# Patient Record
Sex: FEMALE | HISPANIC OR LATINO | URBAN - METROPOLITAN AREA
[De-identification: names, ages, dates, MRNs, and addresses within clinical notes are randomized per-mention and may not be internally consistent; named-entity substitution may affect disease eponyms.]

---
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
## 2019-03-07 ENCOUNTER — EMERGENCY (EMERGENCY)
Facility: HOSPITAL | Age: 19
LOS: 0 days | Discharge: HOME | End: 2019-03-07
Attending: STUDENT IN AN ORGANIZED HEALTH CARE EDUCATION/TRAINING PROGRAM | Admitting: STUDENT IN AN ORGANIZED HEALTH CARE EDUCATION/TRAINING PROGRAM
Payer: MEDICAID

## 2019-03-07 VITALS
TEMPERATURE: 98 F | RESPIRATION RATE: 18 BRPM | HEART RATE: 63 BPM | DIASTOLIC BLOOD PRESSURE: 62 MMHG | OXYGEN SATURATION: 100 % | SYSTOLIC BLOOD PRESSURE: 100 MMHG

## 2019-03-07 VITALS — HEIGHT: 64 IN | WEIGHT: 139.99 LBS

## 2019-03-07 DIAGNOSIS — O03.9 COMPLETE OR UNSPECIFIED SPONTANEOUS ABORTION WITHOUT COMPLICATION: ICD-10-CM

## 2019-03-07 DIAGNOSIS — N93.9 ABNORMAL UTERINE AND VAGINAL BLEEDING, UNSPECIFIED: ICD-10-CM

## 2019-03-07 DIAGNOSIS — R10.31 RIGHT LOWER QUADRANT PAIN: ICD-10-CM

## 2019-03-07 LAB
ALBUMIN SERPL ELPH-MCNC: 4.1 G/DL — SIGNIFICANT CHANGE UP (ref 3.3–5)
ALP SERPL-CCNC: 58 U/L — SIGNIFICANT CHANGE UP (ref 40–120)
ALT FLD-CCNC: 15 U/L — SIGNIFICANT CHANGE UP (ref 12–78)
ANION GAP SERPL CALC-SCNC: 7 MMOL/L — SIGNIFICANT CHANGE UP (ref 5–17)
APPEARANCE UR: CLEAR — SIGNIFICANT CHANGE UP
APTT BLD: 33.5 SEC — SIGNIFICANT CHANGE UP (ref 27.5–36.3)
AST SERPL-CCNC: 14 U/L — LOW (ref 15–37)
BACTERIA # UR AUTO: ABNORMAL
BASOPHILS # BLD AUTO: 0.03 K/UL — SIGNIFICANT CHANGE UP (ref 0–0.2)
BASOPHILS NFR BLD AUTO: 0.5 % — SIGNIFICANT CHANGE UP (ref 0–2)
BILIRUB SERPL-MCNC: 0.5 MG/DL — SIGNIFICANT CHANGE UP (ref 0.2–1.2)
BILIRUB UR-MCNC: NEGATIVE — SIGNIFICANT CHANGE UP
BLD GP AB SCN SERPL QL: SIGNIFICANT CHANGE UP
BUN SERPL-MCNC: 13 MG/DL — SIGNIFICANT CHANGE UP (ref 7–23)
CALCIUM SERPL-MCNC: 8.7 MG/DL — SIGNIFICANT CHANGE UP (ref 8.5–10.1)
CHLORIDE SERPL-SCNC: 108 MMOL/L — SIGNIFICANT CHANGE UP (ref 96–108)
CO2 SERPL-SCNC: 27 MMOL/L — SIGNIFICANT CHANGE UP (ref 22–31)
COLOR SPEC: YELLOW — SIGNIFICANT CHANGE UP
CREAT SERPL-MCNC: 0.73 MG/DL — SIGNIFICANT CHANGE UP (ref 0.5–1.3)
DIFF PNL FLD: ABNORMAL
EOSINOPHIL # BLD AUTO: 0.06 K/UL — SIGNIFICANT CHANGE UP (ref 0–0.5)
EOSINOPHIL NFR BLD AUTO: 1 % — SIGNIFICANT CHANGE UP (ref 0–6)
EPI CELLS # UR: ABNORMAL
GLUCOSE SERPL-MCNC: 87 MG/DL — SIGNIFICANT CHANGE UP (ref 70–99)
GLUCOSE UR QL: NEGATIVE MG/DL — SIGNIFICANT CHANGE UP
HCG SERPL-ACNC: 24 MIU/ML — HIGH
HCT VFR BLD CALC: 35.7 % — SIGNIFICANT CHANGE UP (ref 34.5–45)
HGB BLD-MCNC: 11.6 G/DL — SIGNIFICANT CHANGE UP (ref 11.5–15.5)
IMM GRANULOCYTES NFR BLD AUTO: 0.3 % — SIGNIFICANT CHANGE UP (ref 0–1.5)
INR BLD: 1.16 RATIO — SIGNIFICANT CHANGE UP (ref 0.88–1.16)
KETONES UR-MCNC: NEGATIVE — SIGNIFICANT CHANGE UP
LEUKOCYTE ESTERASE UR-ACNC: NEGATIVE — SIGNIFICANT CHANGE UP
LYMPHOCYTES # BLD AUTO: 1.72 K/UL — SIGNIFICANT CHANGE UP (ref 1–3.3)
LYMPHOCYTES # BLD AUTO: 29.3 % — SIGNIFICANT CHANGE UP (ref 13–44)
MCHC RBC-ENTMCNC: 26.4 PG — LOW (ref 27–34)
MCHC RBC-ENTMCNC: 32.5 GM/DL — SIGNIFICANT CHANGE UP (ref 32–36)
MCV RBC AUTO: 81.3 FL — SIGNIFICANT CHANGE UP (ref 80–100)
MONOCYTES # BLD AUTO: 0.34 K/UL — SIGNIFICANT CHANGE UP (ref 0–0.9)
MONOCYTES NFR BLD AUTO: 5.8 % — SIGNIFICANT CHANGE UP (ref 2–14)
NEUTROPHILS # BLD AUTO: 3.71 K/UL — SIGNIFICANT CHANGE UP (ref 1.8–7.4)
NEUTROPHILS NFR BLD AUTO: 63.1 % — SIGNIFICANT CHANGE UP (ref 43–77)
NITRITE UR-MCNC: NEGATIVE — SIGNIFICANT CHANGE UP
NRBC # BLD: 0 /100 WBCS — SIGNIFICANT CHANGE UP (ref 0–0)
PH UR: 5 — SIGNIFICANT CHANGE UP (ref 5–8)
PLATELET # BLD AUTO: 297 K/UL — SIGNIFICANT CHANGE UP (ref 150–400)
POTASSIUM SERPL-MCNC: 4.4 MMOL/L — SIGNIFICANT CHANGE UP (ref 3.5–5.3)
POTASSIUM SERPL-SCNC: 4.4 MMOL/L — SIGNIFICANT CHANGE UP (ref 3.5–5.3)
PROT SERPL-MCNC: 8.4 GM/DL — HIGH (ref 6–8.3)
PROT UR-MCNC: NEGATIVE MG/DL — SIGNIFICANT CHANGE UP
PROTHROM AB SERPL-ACNC: 12.9 SEC — SIGNIFICANT CHANGE UP (ref 10–12.9)
RBC # BLD: 4.39 M/UL — SIGNIFICANT CHANGE UP (ref 3.8–5.2)
RBC # FLD: 14.9 % — HIGH (ref 10.3–14.5)
RBC CASTS # UR COMP ASSIST: ABNORMAL /HPF (ref 0–4)
SODIUM SERPL-SCNC: 142 MMOL/L — SIGNIFICANT CHANGE UP (ref 135–145)
SP GR SPEC: 1.02 — SIGNIFICANT CHANGE UP (ref 1.01–1.02)
TYPE + AB SCN PNL BLD: SIGNIFICANT CHANGE UP
UROBILINOGEN FLD QL: NEGATIVE MG/DL — SIGNIFICANT CHANGE UP
WBC # BLD: 5.88 K/UL — SIGNIFICANT CHANGE UP (ref 3.8–10.5)
WBC # FLD AUTO: 5.88 K/UL — SIGNIFICANT CHANGE UP (ref 3.8–10.5)
WBC UR QL: SIGNIFICANT CHANGE UP

## 2019-03-07 PROCEDURE — 99284 EMERGENCY DEPT VISIT MOD MDM: CPT

## 2019-03-07 PROCEDURE — 76830 TRANSVAGINAL US NON-OB: CPT | Mod: 26

## 2019-03-07 NOTE — ED STATDOCS - ATTENDING CONTRIBUTION TO CARE
I, Clarice Ferris DO,  performed the initial face to face bedside interview with this patient regarding history of present illness, review of symptoms and relevant past medical, social and family history.  I completed an independent physical examination.  I was the initial provider who evaluated this patient. I have signed out the follow up of any pending tests (i.e. labs, radiological studies) to the ACP.  I have communicated the patient’s plan of care and disposition with the ACP.  The history, relevant review of systems, past medical and surgical history, medical decision making, and physical examination was documented by the scribe in my presence and I attest to the accuracy of the documentation.

## 2019-03-07 NOTE — ED STATDOCS - PROGRESS NOTE DETAILS
18 yr. old female PMH:  presents to ED with heavy  vaginal bleeding 2 1/2 weeks ago that lessened 3 days ago seen at [facility] where US and labs were done . Sent to ED for further evaluation of possible ectopic pregnancy. Seen and examined by attending in Intake. Plan: IV,Labs and US./ Will F/U with results and re evaluate. Rosario WARE Consult with Dr. Kay. Seen and evaluated patient in ED. Rosario WARE [place] W[place]TER[place]: cleared by ob/gyn; o+- no indication for rhogam; [facility]; strict return precautions given.

## 2019-03-07 NOTE — ED STATDOCS - OBJECTIVE STATEMENT
17 y/o female with no relevant PMHx presents to the ED c/o abd pain and vaginal bleeding. Pt reports last menstrual cycle was 1 year ago, does not have regular cycle. States 3 days ago, she had onset of heavy vaginal bleeding and then became light. +associated abd pain. [facility]. Today, had outpatient US and was sent to ED for r/o ectopic. Denies dizziness or lightheadedness. One previous pregnancy born at 29 weeks, delivered at [facility]. .

## 2019-03-07 NOTE — ED ADULT NURSE NOTE - NSIMPLEMENTINTERV_GEN_ALL_ED
Lot #: [id number] Lot #: [id number] Implemented All Universal Safety Interventions:  [place] to call system. Call bell, personal items and telephone within reach. Instruct patient to call for assistance. Room bathroom lighting operational. Non-slip footwear when patient is off stretcher. Physically safe environment: no spills, clutter or unnecessary equipment. Stretcher in lowest position, wheels locked, appropriate side rails in place.

## 2019-03-07 NOTE — CONSULT NOTE ADULT - SUBJECTIVE AND OBJECTIVE BOX
Patient is a 18y old  Female who presents with a chief complaint of vaginal bleeding. Patient states she began experiencing significant vaginal bleeding on last week. Patient was seen at [facility] on 19, where she was found to have a HCG of >1100. Patient presented for an US at [facility] today, and was found to have a possible ectopic finding on exam, as per patient history. Patient presented to  ED for further evaluation.    In the ED, patient was found to have an HCG of 24 today. US transvaginal exhibited a cystic structure in the right adnexa measuring approximately 0.8 x 0.7 x 0.9 cm with peripheral vascularity, concerning for a possible right ectopic pregnancy, as well as free complex fluid in the right adnexa, likely hemorrhagic fluid, which may reflect a partially ruptured ectopic pregnancy vs. ruptured right adnexal hemorrhagic cyst. Patient c/o mild cramping and improved bleeding but denied fever, chills, SOB, CP.    Patient was educated on pathophysiology of missed . Patient was instructed to follow up with OB within 2-3 days for repeat HCG. Patient was educated on importance of trending HCG on follow up. Patient understood plan and agreed to follow up.

## 2019-03-07 NOTE — ED ADULT TRIAGE NOTE - CHIEF COMPLAINT QUOTE
[facility] of lower abdominal/pelvic pain and vaginal bleeding for 2 weeks. initially bleeding was heaving now patient is spotting. sent to rule out ectopic pregnancy. patient has US images with her.

## 2019-03-08 LAB
CULTURE RESULTS: NO GROWTH — SIGNIFICANT CHANGE UP
SPECIMEN SOURCE: SIGNIFICANT CHANGE UP

## 2021-11-12 ENCOUNTER — EMERGENCY (EMERGENCY)
Facility: HOSPITAL | Age: 21
LOS: 0 days | Discharge: HOME | End: 2021-11-13
Attending: EMERGENCY MEDICINE
Payer: MEDICAID

## 2021-11-12 VITALS — HEIGHT: 64 IN | WEIGHT: 139.99 LBS

## 2021-11-12 DIAGNOSIS — R51.9 HEADACHE, UNSPECIFIED: ICD-10-CM

## 2021-11-12 DIAGNOSIS — F12.20 CANNABIS DEPENDENCE, UNCOMPLICATED: ICD-10-CM

## 2021-11-12 DIAGNOSIS — Z20.822 CONTACT WITH AND (SUSPECTED) EXPOSURE TO COVID-19: ICD-10-CM

## 2021-11-12 DIAGNOSIS — F17.200 NICOTINE DEPENDENCE, UNSPECIFIED, UNCOMPLICATED: ICD-10-CM

## 2021-11-12 DIAGNOSIS — R44.0 AUDITORY HALLUCINATIONS: ICD-10-CM

## 2021-11-12 DIAGNOSIS — F32.9 MAJOR DEPRESSIVE DISORDER, SINGLE EPISODE, UNSPECIFIED: ICD-10-CM

## 2021-11-12 DIAGNOSIS — N39.0 URINARY TRACT INFECTION, SITE NOT SPECIFIED: ICD-10-CM

## 2021-11-12 LAB
ALBUMIN SERPL ELPH-MCNC: 4.5 G/DL — SIGNIFICANT CHANGE UP (ref 3.3–5)
ALP SERPL-CCNC: 54 U/L — SIGNIFICANT CHANGE UP (ref 40–120)
ALT FLD-CCNC: 17 U/L — SIGNIFICANT CHANGE UP (ref 12–78)
ANION GAP SERPL CALC-SCNC: 5 MMOL/L — SIGNIFICANT CHANGE UP (ref 5–17)
APAP SERPL-MCNC: <2 UG/ML — LOW (ref 10–30)
APPEARANCE UR: ABNORMAL
AST SERPL-CCNC: 17 U/L — SIGNIFICANT CHANGE UP (ref 15–37)
BACTERIA # UR AUTO: ABNORMAL
BASOPHILS # BLD AUTO: 0.06 K/UL — SIGNIFICANT CHANGE UP (ref 0–0.2)
BASOPHILS NFR BLD AUTO: 0.7 % — SIGNIFICANT CHANGE UP (ref 0–2)
BILIRUB SERPL-MCNC: 0.5 MG/DL — SIGNIFICANT CHANGE UP (ref 0.2–1.2)
BILIRUB UR-MCNC: NEGATIVE — SIGNIFICANT CHANGE UP
BUN SERPL-MCNC: 12 MG/DL — SIGNIFICANT CHANGE UP (ref 7–23)
CALCIUM SERPL-MCNC: 9.1 MG/DL — SIGNIFICANT CHANGE UP (ref 8.5–10.1)
CHLORIDE SERPL-SCNC: 108 MMOL/L — SIGNIFICANT CHANGE UP (ref 96–108)
CO2 SERPL-SCNC: 28 MMOL/L — SIGNIFICANT CHANGE UP (ref 22–31)
COLOR SPEC: YELLOW — SIGNIFICANT CHANGE UP
CREAT SERPL-MCNC: 0.81 MG/DL — SIGNIFICANT CHANGE UP (ref 0.5–1.3)
DIFF PNL FLD: NEGATIVE — SIGNIFICANT CHANGE UP
EOSINOPHIL # BLD AUTO: 0.08 K/UL — SIGNIFICANT CHANGE UP (ref 0–0.5)
EOSINOPHIL NFR BLD AUTO: 0.9 % — SIGNIFICANT CHANGE UP (ref 0–6)
EPI CELLS # UR: SIGNIFICANT CHANGE UP
ETHANOL SERPL-MCNC: <10 MG/DL — SIGNIFICANT CHANGE UP (ref 0–10)
GLUCOSE SERPL-MCNC: 95 MG/DL — SIGNIFICANT CHANGE UP (ref 70–99)
GLUCOSE UR QL: NEGATIVE MG/DL — SIGNIFICANT CHANGE UP
HCG SERPL-ACNC: <1 MIU/ML — SIGNIFICANT CHANGE UP
HCT VFR BLD CALC: 40 % — SIGNIFICANT CHANGE UP (ref 34.5–45)
HGB BLD-MCNC: 13.4 G/DL — SIGNIFICANT CHANGE UP (ref 11.5–15.5)
IMM GRANULOCYTES NFR BLD AUTO: 0.2 % — SIGNIFICANT CHANGE UP (ref 0–1.5)
KETONES UR-MCNC: NEGATIVE — SIGNIFICANT CHANGE UP
LEUKOCYTE ESTERASE UR-ACNC: ABNORMAL
LYMPHOCYTES # BLD AUTO: 2.68 K/UL — SIGNIFICANT CHANGE UP (ref 1–3.3)
LYMPHOCYTES # BLD AUTO: 31.1 % — SIGNIFICANT CHANGE UP (ref 13–44)
MCHC RBC-ENTMCNC: 29.3 PG — SIGNIFICANT CHANGE UP (ref 27–34)
MCHC RBC-ENTMCNC: 33.5 GM/DL — SIGNIFICANT CHANGE UP (ref 32–36)
MCV RBC AUTO: 87.3 FL — SIGNIFICANT CHANGE UP (ref 80–100)
MONOCYTES # BLD AUTO: 0.33 K/UL — SIGNIFICANT CHANGE UP (ref 0–0.9)
MONOCYTES NFR BLD AUTO: 3.8 % — SIGNIFICANT CHANGE UP (ref 2–14)
NEUTROPHILS # BLD AUTO: 5.45 K/UL — SIGNIFICANT CHANGE UP (ref 1.8–7.4)
NEUTROPHILS NFR BLD AUTO: 63.3 % — SIGNIFICANT CHANGE UP (ref 43–77)
NITRITE UR-MCNC: POSITIVE
PH UR: 5 — SIGNIFICANT CHANGE UP (ref 5–8)
PLATELET # BLD AUTO: 313 K/UL — SIGNIFICANT CHANGE UP (ref 150–400)
POTASSIUM SERPL-MCNC: 3.8 MMOL/L — SIGNIFICANT CHANGE UP (ref 3.5–5.3)
POTASSIUM SERPL-SCNC: 3.8 MMOL/L — SIGNIFICANT CHANGE UP (ref 3.5–5.3)
PROT SERPL-MCNC: 8.6 GM/DL — HIGH (ref 6–8.3)
PROT UR-MCNC: 15 MG/DL
RBC # BLD: 4.58 M/UL — SIGNIFICANT CHANGE UP (ref 3.8–5.2)
RBC # FLD: 12.8 % — SIGNIFICANT CHANGE UP (ref 10.3–14.5)
RBC CASTS # UR COMP ASSIST: SIGNIFICANT CHANGE UP /HPF (ref 0–4)
SALICYLATES SERPL-MCNC: <1.7 MG/DL — LOW (ref 2.8–20)
SARS-COV-2 RNA SPEC QL NAA+PROBE: SIGNIFICANT CHANGE UP
SODIUM SERPL-SCNC: 141 MMOL/L — SIGNIFICANT CHANGE UP (ref 135–145)
SP GR SPEC: 1.01 — SIGNIFICANT CHANGE UP (ref 1.01–1.02)
TSH SERPL-MCNC: 1.66 UU/ML — SIGNIFICANT CHANGE UP (ref 0.34–4.82)
UROBILINOGEN FLD QL: NEGATIVE MG/DL — SIGNIFICANT CHANGE UP
WBC # BLD: 8.62 K/UL — SIGNIFICANT CHANGE UP (ref 3.8–10.5)
WBC # FLD AUTO: 8.62 K/UL — SIGNIFICANT CHANGE UP (ref 3.8–10.5)
WBC UR QL: SIGNIFICANT CHANGE UP

## 2021-11-12 PROCEDURE — 93010 ELECTROCARDIOGRAM REPORT: CPT

## 2021-11-12 PROCEDURE — 96361 HYDRATE IV INFUSION ADD-ON: CPT

## 2021-11-12 PROCEDURE — 93005 ELECTROCARDIOGRAM TRACING: CPT

## 2021-11-12 PROCEDURE — U0005: CPT

## 2021-11-12 PROCEDURE — 36415 COLL VENOUS BLD VENIPUNCTURE: CPT

## 2021-11-12 PROCEDURE — 70450 CT HEAD/BRAIN W/O DYE: CPT | Mod: MA

## 2021-11-12 PROCEDURE — 99285 EMERGENCY DEPT VISIT HI MDM: CPT | Mod: 25

## 2021-11-12 PROCEDURE — 84443 ASSAY THYROID STIM HORMONE: CPT

## 2021-11-12 PROCEDURE — 80053 COMPREHEN METABOLIC PANEL: CPT

## 2021-11-12 PROCEDURE — U0003: CPT

## 2021-11-12 PROCEDURE — 96374 THER/PROPH/DIAG INJ IV PUSH: CPT

## 2021-11-12 PROCEDURE — 84702 CHORIONIC GONADOTROPIN TEST: CPT

## 2021-11-12 PROCEDURE — 81001 URINALYSIS AUTO W/SCOPE: CPT

## 2021-11-12 PROCEDURE — 80307 DRUG TEST PRSMV CHEM ANLYZR: CPT

## 2021-11-12 PROCEDURE — 99284 EMERGENCY DEPT VISIT MOD MDM: CPT

## 2021-11-12 PROCEDURE — 87040 BLOOD CULTURE FOR BACTERIA: CPT

## 2021-11-12 PROCEDURE — 85025 COMPLETE CBC W/AUTO DIFF WBC: CPT

## 2021-11-12 PROCEDURE — 70450 CT HEAD/BRAIN W/O DYE: CPT | Mod: 26,MA

## 2021-11-12 RX ORDER — CEFTRIAXONE 500 MG/1
1000 INJECTION, POWDER, FOR SOLUTION INTRAMUSCULAR; INTRAVENOUS ONCE
Refills: 0 | Status: COMPLETED | OUTPATIENT
Start: 2021-11-12 | End: 2021-11-12

## 2021-11-12 RX ORDER — SODIUM CHLORIDE 9 MG/ML
1000 INJECTION INTRAMUSCULAR; INTRAVENOUS; SUBCUTANEOUS ONCE
Refills: 0 | Status: COMPLETED | OUTPATIENT
Start: 2021-11-12 | End: 2021-11-12

## 2021-11-12 RX ORDER — IBUPROFEN 200 MG
600 TABLET ORAL ONCE
Refills: 0 | Status: COMPLETED | OUTPATIENT
Start: 2021-11-12 | End: 2021-11-12

## 2021-11-12 RX ORDER — METOCLOPRAMIDE HCL 10 MG
10 TABLET ORAL ONCE
Refills: 0 | Status: DISCONTINUED | OUTPATIENT
Start: 2021-11-12 | End: 2021-11-12

## 2021-11-12 RX ORDER — ACETAMINOPHEN 500 MG
975 TABLET ORAL ONCE
Refills: 0 | Status: COMPLETED | OUTPATIENT
Start: 2021-11-12 | End: 2021-11-12

## 2021-11-12 RX ADMIN — CEFTRIAXONE 1000 MILLIGRAM(S): 500 INJECTION, POWDER, FOR SOLUTION INTRAMUSCULAR; INTRAVENOUS at 23:46

## 2021-11-12 RX ADMIN — SODIUM CHLORIDE 1000 MILLILITER(S): 9 INJECTION INTRAMUSCULAR; INTRAVENOUS; SUBCUTANEOUS at 20:50

## 2021-11-12 RX ADMIN — Medication 975 MILLIGRAM(S): at 19:10

## 2021-11-12 RX ADMIN — Medication 600 MILLIGRAM(S): at 19:10

## 2021-11-12 RX ADMIN — Medication 600 MILLIGRAM(S): at 18:27

## 2021-11-12 RX ADMIN — Medication 975 MILLIGRAM(S): at 18:27

## 2021-11-12 RX ADMIN — SODIUM CHLORIDE 1000 MILLILITER(S): 9 INJECTION INTRAMUSCULAR; INTRAVENOUS; SUBCUTANEOUS at 20:10

## 2021-11-12 RX ADMIN — SODIUM CHLORIDE 1000 MILLILITER(S): 9 INJECTION INTRAMUSCULAR; INTRAVENOUS; SUBCUTANEOUS at 19:27

## 2021-11-12 RX ADMIN — SODIUM CHLORIDE 1000 MILLILITER(S): 9 INJECTION INTRAMUSCULAR; INTRAVENOUS; SUBCUTANEOUS at 18:26

## 2021-11-12 NOTE — ED PROVIDER NOTE - NSFOLLOWUPINSTRUCTIONS_ED_ALL_ED_FT
FOLLOW UP WITH PMD & PSYCHIATRIST WITHIN 1-2DAYS, CALL TO MAKE APPOINTMENT  COME BACK TO ED IF YOUR CONDITION WORSENS OR IF YOU DEVELOP FEVER GREATER THAN 100.4F, CHEST PAIN,  SHORTNESS OF BREATH OR ANY OTHER SYMPTOMS CONCERNING TO YOU  TAKE TYLENOL (ACETAMINOPHEN) 650 MG EVERY 6 HOURS AS NEEDED FOR PAIN    IF YOU WERE PRESRCIBED ANY MEDICATIONS FROM TODAY'S VISIT, TAKE THEM AS DIRECTED (keflex for urine infection) FOLLOW UP WITH PMD & PSYCHIATRIST WITHIN 1-2DAYS, CALL TO MAKE APPOINTMENT  COME BACK TO ED IF YOUR CONDITION WORSENS OR IF YOU DEVELOP FEVER GREATER THAN 100.4F, CHEST PAIN,  SHORTNESS OF BREATH OR ANY OTHER SYMPTOMS CONCERNING TO YOU  TAKE TYLENOL (ACETAMINOPHEN) 650 MG EVERY 6 HOURS AS NEEDED FOR PAIN    IF YOU WERE PRESRCIBED ANY MEDICATIONS FROM TODAY'S VISIT, TAKE THEM AS DIRECTED (keflex for urine infection)    Urinary Tract Infection, Adult  ImageA urinary tract infection (UTI) is an infection of any part of the urinary tract, which includes the kidneys, ureters, bladder, and urethra. These organs make, store, and get rid of urine in the body. UTI can be a bladder infection (cystitis) or kidney infection (pyelonephritis).    What are the causes?  This infection may be caused by fungi, viruses, or bacteria. Bacteria are the most common cause of UTIs. This condition can also be caused by repeated incomplete emptying of the bladder during urination.    What increases the risk?  This condition is more likely to develop if:    You ignore your need to urinate or hold urine for long periods of time.  You do not empty your bladder completely during urination.  You wipe back to front after urinating or having a bowel movement, if you are female.  You are uncircumcised, if you are male.  You are constipated.  You have a urinary catheter that stays in place (indwelling).  You have a weak defense (immune) system.  You have a medical condition that affects your bowels, kidneys, or bladder.  You have diabetes.  You take antibiotic medicines frequently or for long periods of time, and the antibiotics no longer work well against certain types of infections (antibiotic resistance).  You take medicines that irritate your urinary tract.  You are exposed to chemicals that irritate your urinary tract.  You are female.    What are the signs or symptoms?  Symptoms of this condition include:    Fever.  Frequent urination or passing small amounts of urine frequently.  Needing to urinate urgently.  Pain or burning with urination.  Urine that smells bad or unusual.  Cloudy urine.  Pain in the lower abdomen or back.  Trouble urinating.  Blood in the urine.  Vomiting or being less hungry than normal.  Diarrhea or abdominal pain.  Vaginal discharge, if you are female.    How is this diagnosed?  This condition is diagnosed with a medical history and physical exam. You will also need to provide a urine sample to test your urine. Other tests may be done, including:    Blood tests.  Sexually transmitted disease (STD) testing.    If you have had more than one UTI, a cystoscopy or imaging studies may be done to determine the cause of the infections.    How is this treated?  Treatment for this condition often includes a combination of two or more of the following:    Antibiotic medicine.  Other medicines to treat less common causes of UTI.  Over-the-counter medicines to treat pain.  Drinking enough water to stay hydrated.    Follow these instructions at home:  Take over-the-counter and prescription medicines only as told by your health care provider.  If you were prescribed an antibiotic, take it as told by your health care provider. Do not stop taking the antibiotic even if you start to feel better.  Avoid alcohol, caffeine, tea, and carbonated beverages. They can irritate your bladder.  Drink enough fluid to keep your urine clear or pale yellow.  Keep all follow-up visits as told by your health care provider. This is important.  ImageMake sure to:    Empty your bladder often and completely. Do not hold urine for long periods of time.  Empty your bladder before and after sex.  Wipe from front to back after a bowel movement if you are female. Use each tissue one time when you wipe.    Contact a health care provider if:  You have back pain.  You have a fever.  You feel nauseous or vomit.  Your symptoms do not get better after 3 days.  Your symptoms go away and then return.  Get help right away if:  You have severe back pain or lower abdominal pain.  You are vomiting and cannot keep down any medicines or water.  This information is not intended to replace advice given to you by your health care provider. Make sure you discuss any questions you have with your health care provider.

## 2021-11-12 NOTE — ED ADULT NURSE NOTE - CAS ELECT INFOMATION PROVIDED
DC instructions Per NP Kierra & MD [place], pt cleared for discharge. pt provided with discharge instructions, verbalized understanding./DC instructions

## 2021-11-12 NOTE — ED BEHAVIORAL HEALTH ASSESSMENT NOTE - SUMMARY
Pt is a 20yo F, domiciled with mother, grandmother and 1yo son, unemployed, no known psychiatric history, substance use history of chronic cannabis use history (notes from OB admission report use during pregnancy), pt reports hx of outpt treatment, reports last use yesterday, typical use 4x per day, no known medical problems, reports legal hx of arrest for shoplifting at age 15, no known trauma hx, consult called to evaluate AH. Pt presents with poor attention, requiring repeated prompting to answer simple questions. She is fully oriented, but presents as concrete. She reports a history of chronic cannabis use, smoking up to four times daily, last use yesterday. Her presentation is likely best explained by cannabis use. She reports she is open to referral to talk therapy, though her reasoning is not clear for how this would be helpful. Recommend CPS referral given patient's report of chronic cannabis use in the setting of caring for a minor child. Will hold for collateral from patient's mother.

## 2021-11-12 NOTE — ED ADULT TRIAGE NOTE - CHIEF COMPLAINT QUOTE
pt c/o headache lasting 3 months. rates it 4/10. states it feel like it her "inner brain". Has not taken any medications for pain. Denies dizziness, vision changes, lightheadedness

## 2021-11-12 NOTE — ED PROVIDER NOTE - PROGRESS NOTE DETAILS
paged Tele psych awaiting return call. -Kacey Smith PA-C spoke to Psychiatrist will consult with patient and family. -Kacey Smith PA-C hold for collateral information this morning, recommend calling darnell Jose DO spoke with Dr. Monica jaime earlier in the night, recommends making cps report due to cannibis use and voices telling her to "hurt her son" BIRGIT Jose DO spoke with Dr. Monica jaime earlier in the night, recommends making cps report due to cannibis use and voices telling her to "hurt her son"  ID [id number]. BIRGIT Jose DO so am doctor re eval and colateral information pending, disposition pending no issues overnight BIRGIT Jose DO [place]: pt endorsed to me from prior physician PENDING: psychiatry reassesment for collateral info.  LIKELY DISPOSITION: pt already medically cleared, will need keflex 500mg bid for 7d for uti.

## 2021-11-12 NOTE — ED BEHAVIORAL HEALTH ASSESSMENT NOTE - RISK ASSESSMENT
Low Acute Suicide Risk af headaches  cf substance use, not employed, single  pf supportive family, no hx SA     overall low acute risk of harm to self

## 2021-11-12 NOTE — ED PROVIDER NOTE - OBJECTIVE STATEMENT
21F no pmh presents with a headache described  as "cerebellar" for x4 months, has never taken meds for this has not seen MD for this would like an "MRI" tonight, pt reports "changes" have made her headache worse, when asked to elaborate she describes "life changes" such has having a dog, no vision changes Denies numbness, tingling or loss of sensation. Denies loss of motor function, but feels as if has to "walk on tippy toes". Reports she lives at home with her mother, brother and son, when asked if she hears voices or sees things she reports she sometimes hears voices that "tell her bad things" like "to hurt my son", "but only sometimes" denies SI/HI. pt states her HA are exacerbated with her marijuana intake at times. pt admits to smoking marajuana daily since she was 14 yo.

## 2021-11-12 NOTE — ED ADULT NURSE REASSESSMENT NOTE - NS ED NURSE REASSESS COMMENT FT1
Pt resting comfortably in bed with no acute distress noted. Pt on constant observation, updated on their status, the current plan of care, and available results no needs or requests at this time.  Will continue to monitor/reassess.

## 2021-11-12 NOTE — ED STATDOCS - PROGRESS NOTE DETAILS
Rogelio VILLAGOMEZ: 21F no pmh presents with a headache described  as "cerebellar" for x4 months, has never taken meds for this has not seen MD for this would like an "MRI" tonight, pt reports "changes" have made her headache worse, when asked to elaborate she describes "life changes" such has having a dog, no vision changes Denies numbness, tingling or loss of sensation. Denies loss of motor function, but feels as if has to "walk on tippy toes". Reports she lives at home with her mother, brother and son, when asked if she hears voices or sees things she reports she sometimes hears voices that "tell her bad things" like "to hurt my son", "but only sometimes" denies SI. Flat affected noted during assessment with non linear, and ill logical rational for behavior. Will send to [facility] for further eval.

## 2021-11-12 NOTE — ED BEHAVIORAL HEALTH ASSESSMENT NOTE - HPI (INCLUDE ILLNESS QUALITY, SEVERITY, DURATION, TIMING, CONTEXT, MODIFYING FACTORS, ASSOCIATED SIGNS AND SYMPTOMS)
I was having bad thoughts, not trusting myself, bullying myself   no SI, I don't know what I say because I have bad memory   I know what my intentions are but I don't remember what i'm saying   I think very negatively about every  situation, I see police, that's a big headache, I think it's towards me, in a bad way   cutting as a teenager, last age 2016, age 15, never inteded to die   rep brother is deaf, fam is quiet, doesn't communicate  no AVH/PI     social lives with mother, grandmother, 1yo son, lives with her, on weekends goes to grandparents, has shared custody with father, supported on savings   medical  none,    at [facility], nov 12, friday, pres the other white amisha, poor fund of knowledge     covid  not vaccinated, no positive tests, no exp/travel     legal 15 arrested shoplifting, no acces to a gun   poor attention     substance smoked yseterday, '' I have a lot of patientce in myself'     psych no psych/therapist, rep dx with bipolar, saw therapist at [facility], was using drugs at the time, rep was smoking weed my whole entire life, smoke every day four times a day, smoked yesterday, no drug treatment, rep admitted 2013  at age 13 bc drank rubbing alcohol     no ACS Pt is a 22yo F, domiciled with mother, grandmother and 1yo son, unemployed, no known psychiatric history, substance use history of chronic cannabis use history (notes from OB admission report use during pregnancy), pt reports hx of outpt treatment, reports last use yesterday, typical use 4x per day, no known medical problems, reports legal hx of arrest for shoplifting at age 15, no known trauma hx,     I was having bad thoughts, not trusting myself, bullying myself   no SI, I don't know what I say because I have bad memory   I know what my intentions are but I don't remember what i'm saying   I think very negatively about every  situation, I see police, that's a big headache, I think it's towards me, in a bad way   cutting as a teenager, last age 2016, age 15, never inteded to die   rep brother is deaf, fam is quiet, doesn't communicate  no AVH/PI     social lives with mother, grandmother, 1yo son, lives with her, on weekends goes to grandparents, has shared custody with father, supported on savings   medical  none, [phone]   at [facility], nov 12, friday, pres the other white amisha, poor fund of knowledge     covid  not vaccinated, no positive tests, no exp/travel     legal 15 arrested shoplifting, no acces to a gun   poor attention     substance smoked yseterday, '' I have a lot of patientce in myself'     psych no psych/therapist, rep dx with bipolar, saw therapist at [facility], was using drugs at the time, rep was smoking weed my whole entire life, smoke every day four times a day, smoked yesterday, no drug treatment, rep admitted 2013  at age 13 bc drank rubbing alcohol     no ACS Pt is a 20yo F, domiciled with mother, grandmother and 1yo son, unemployed, no known psychiatric history, substance use history of chronic cannabis use history (notes from OB admission report use during pregnancy), pt reports hx of outpt treatment, reports last use yesterday, typical use 4x per day, no known medical problems, reports legal hx of arrest for shoplifting at age 15, no known trauma hx, consult called to evaluate AH.    Pt was a vague historian with poor attention, and did not answer direct questions without prompting. She reported that she was 'having bad thoughts', though she was vague in what this meant, she reported 'not trusting myself, bullying myself,' and was not able to be more concrete, she denied SI.  She stated 'I don't know what I say because I have a bad memory' but was unable to report how she came to be aware of that which she claimed to have forgotten. She reported that "I know my intentions are but I don't remember what I'm saying" but wasn't able to give an example or to be more concrete. She reported that she 'thinks negatively about every situation' such as 'if I see police, that's a big headache, I think it's towards me, in a bad way." She denied AVH, PI. She reported hx of cutting as a teenager, at age 15, denied current SI/HI. She reports current stressors but is vague in their descriptions, giving single word answer such as 'communication', 'toxic', and eventually is able to report 'my brother is deaf and my family is quiet and doesn't communicate'.  She gave consent for contact with her mother, she reports she has shared custody of her 1yo son with her son's father. She reports she smoked cannabis last yesterday.     Covid screener: reports not vaccinated, denies hx of positive tests x 90 days, denies exposure/travel x 14 days

## 2021-11-12 NOTE — ED ADULT NURSE REASSESSMENT NOTE - NS ED NURSE REASSESS COMMENT FT1
handoff report taken from day RN Adama. Pt resting comfortably in bed with no acute distress noted. Pt on constant observation, updated on their status, the current plan of care, and available results no needs or requests at this time.  Will continue to monitor/reassess.   EKG, labs, NS bolus, COVID completed,   belongings secured,

## 2021-11-12 NOTE — ED PROVIDER NOTE - PATIENT PORTAL LINK FT
You can access the FollowMyHealth Patient Portal offered by [facility] by registering at the following website: [email or web address]. By joining [facility]’s FollowMyHealth portal, you will also be able to view your health information using other applications (apps) compatible with our system.

## 2021-11-12 NOTE — ED ADULT NURSE REASSESSMENT NOTE - NS ED NURSE REASSESS COMMENT FT1
unable top obtain Blood cx, MD Castillo made aware, IVF bolus initiated for additional hydration. will hold ABX.

## 2021-11-12 NOTE — ED ADULT TRIAGE NOTE - HEIGHT IN FEET
Patient here today for nurse blood pressure check. BP Readings from Last 1 Encounters:   01/29/19 1056 132/68   01/29/19 1035 142/62       Blood Pressure normal.    Patient to follow up as recommended by PCP. 5

## 2021-11-13 VITALS
SYSTOLIC BLOOD PRESSURE: 105 MMHG | DIASTOLIC BLOOD PRESSURE: 71 MMHG | HEART RATE: 67 BPM | RESPIRATION RATE: 16 BRPM | OXYGEN SATURATION: 98 % | TEMPERATURE: 98 F

## 2021-11-13 DIAGNOSIS — F12.20 CANNABIS DEPENDENCE, UNCOMPLICATED: ICD-10-CM

## 2021-11-13 LAB — PCP SPEC-MCNC: SIGNIFICANT CHANGE UP

## 2021-11-13 PROCEDURE — 99225: CPT

## 2021-11-13 RX ORDER — CEPHALEXIN 500 MG
1 CAPSULE ORAL
Qty: 14 | Refills: 0
Start: 2021-11-13 | End: 2021-11-19

## 2021-11-13 RX ORDER — ACETAMINOPHEN 500 MG
650 TABLET ORAL EVERY 6 HOURS
Refills: 0 | Status: DISCONTINUED | OUTPATIENT
Start: 2021-11-13 | End: 2021-11-13

## 2021-11-13 NOTE — ED ADULT NURSE REASSESSMENT NOTE - NS ED NURSE REASSESS COMMENT FT1
Pt resting comfortably in bed with no acute distress noted. Pt updated on their status, the current plan of care, and available results no needs or requests at this time. Call bell within reach. Will continue to monitor/reassess.   CPS called by Charge MERON Christiansen.

## 2021-11-13 NOTE — ED BEHAVIORAL HEALTH NOTE - BEHAVIORAL HEALTH NOTE
As per Suri at [facility] pending acceptance , Collateral needed by Pts family. Sw to follow up.

## 2021-11-13 NOTE — PROGRESS NOTE BEHAVIORAL HEALTH - NSBHCONSULTFOLLOWDETAILS_PSY_A_CORE FT
Patient psychiatrically cleared for discharge Patient able to engage in safety plan, does not present as in eminent risk of harm to self or others. AH triggered by Cannabis use. Patient is psychiatrically cleared for discharge when medically cleared.

## 2021-11-13 NOTE — PROGRESS NOTE BEHAVIORAL HEALTH - SUMMARY
From [facility]: Pt is a 20yo F, domiciled with mother, grandmother and 3yo son, unemployed, no known psychiatric history, substance use history of chronic cannabis use history (notes from OB admission report use during pregnancy), pt reports hx of outpt treatment, reports last use yesterday, typical use 4x per day, no known medical problems, reports legal hx of arrest for shoplifting at age 15, no known trauma hx, consult called to evaluate AH.    Pt was a vague historian with poor attention, and did not answer direct questions without prompting. She reported that she was 'having bad thoughts', though she was vague in what this meant, she reported 'not trusting myself, bullying myself,' and was not able to be more concrete, she denied SI.  She stated 'I don't know what I say because I have a bad memory' but was unable to report how she came to be aware of that which she claimed to have forgotten. She reported that "I know my intentions are but I don't remember what I'm saying" but wasn't able to give an example or to be more concrete. She reported that she 'thinks negatively about every situation' such as 'if I see police, that's a big headache, I think it's towards me, in a bad way." She denied AVH, PI. She reported hx of cutting as a teenager, at age 15, denied current SI/HI. She reports current stressors but is vague in their descriptions, giving single word answer such as 'communication', 'toxic', and eventually is able to report 'my brother is deaf and my family is quiet and doesn't communicate'.  She gave consent for contact with her mother, she reports she has shared custody of her 3yo son with her son's father. She reports she smoked cannabis last yesterday.      11/13/2021: Patient seen and reassessed. Reports feeling better and getting some sleep overnight. States the headaches and voices are not constant but only after smoking marijuana. States she knows it is "not good for me to smoke every day' because the marijuana affects her memory, gives her headaches and the voices. States she is interested in treatment to reduce use. Denies commands. Denies S/H ideations, plans or intent. Pt is vague and a poor historian. Today she denies 'having bad thoughts', and denied questions on thoughts of hurting others or herself. Currently denying AH. She gave consent for contact with her mother, who has shared custody of her 3yo son with her son's father.     11/13/2021: Patient seen and reassessed. Reports feeling better and getting some sleep overnight. States the headaches and voices are not constant but only after smoking marijuana. States she knows it is "not good for me to smoke every day' because the marijuana affects her memory, gives her headaches and the voices. States she is interested in treatment to reduce use. Denies commands. Denies S/H ideations, plans or intent. Pt is vague and a poor historian. Thoughts somewhat loose which she states is because it is still too soon after her smoking marijuana yesterday. She also denies 'having bad thoughts' today and denied questions on thoughts of hurting others or herself. Currently denying AH. Denies VH, paranoia or delusions. Mood neutral, affect constricted. She is alert and oriented. Insight and judgment fair. She gave consent for contact with her mother, who has shared custody of her 3yo son with her son's father.     Collateral Information: Mother, More Campbell, contacted at [phone] with the assistance of Gamaliel ID# [id number] via [employer]. Mother could not add any information as to reason for patient being in the ED. Denies behavioral changes or any concerns of risk of harm to self or others. Mother had no objections to patient being discharged home and was very pleased with this disposition.  Case discussed with Dr. Herring. Patient does not meet the criteria for emergency psychiatric admission at this time, no acute psychosis, is able to engage in safety plan,  and is psychiatrically cleared for discharge home when medically cleared.

## 2021-11-13 NOTE — PROGRESS NOTE BEHAVIORAL HEALTH - NSBHCHARTREVIEWVS_PSY_A_CORE FT
Temperature  Temp (F): 98 Degrees F  Temp (C) Temp (C): 36.7 Degrees C    Heart Rate  Heart Rate Heart Rate (beats/min): 67 /min    Noninvasive Blood Pressure  BP Systolic Systolic: 105 mm Hg  BP Diastolic Diastolic (mm Hg): 71 mm Hg    Respiratory/Pulse Oximetry/Oxygen Therapy  Respiration Rate (breaths/min) Respiration Rate (breaths/min): 16 /min  SpO2 (%) SpO2 (%): 98 %  O2 Delivery/Oxygen Delivery Method Patient On (Patient Delivery Method): room air

## 2021-11-13 NOTE — PROGRESS NOTE BEHAVIORAL HEALTH - RISK ASSESSMENT
Low Acute Risk - No prior psychiatric history or treatment, no current s/h ideations, plan or intent.

## 2021-11-13 NOTE — ED BEHAVIORAL HEALTH NOTE - BEHAVIORAL HEALTH NOTE
===================  PRE-HOSPITAL COURSE  ===================    SOURCE: Chart     DETAILS:  Arrived via walk-in complaining of headache    ============  ED COURSE   ============    SOURCE: ED, Chart    ARRIVAL: walk-in    BELONGINGS: No items of note    BEHAVIOR: Patient arrived to the ED alert and oriented x3, patient was cooperative with ED medical and safety protocols. Patient initially without psych complaints, affect flat, later revealed sometimes has AH that can tell her to hurt son, no active HI, no SI, no VH detailed. Thought process/speech WNL, patient remained in behavioral control prior to assessment.      TREATMENT: No PRN psychiatric medication prior to assessment     VISITORS: None      ========================  COLLATERAL  ========================    NAME: More Campbell     NUMBER: [phone]    RELATIONSHIP: Mother    COMMENTS: Left voicemail requesting return call      COVID Exposure Screen- collateral (i.e. third-party, chart review, belongings, etc; include EMS and ED staff)  1.	*Has the patient had a COVID-19 test in the last 90 days?  (  ) Yes   (  ) No   ( x ) Unknown- Reason: _____  IF YES PROCEED TO QUESTION #2. IF NO OR UNKNOWN, PLEASE SKIP TO QUESTION #3.  2.	Date of test(s) and result(s): ________  3.	*Has the patient tested positive for COVID-19 antibodies? (  ) Yes   (  ) No   (x  ) Unknown- Reason: _____  IF YES PROCEED TO QUESTION #4. IF NO or UNKNOWN, PLEASE SKIP TO QUESTION #5.  4.	Date of positive antibody test: ________  5.	*Has the patient received 2 doses of the COVID-19 vaccine? (  ) Yes   (  ) No   (x  ) Unknown- Reason: _____  IF YES PROCEED TO QUESTION #6. IF NO or UNKNOWN, PLEASE SKIP TO QUESTION #7.  6.	 Date of second dose: ________  7.	*In the past 10 days, has the patient been around anyone with a positive COVID-19 test?* (  ) Yes   (  ) No   (x  ) Unknown- Reason: __  IF YES PROCEED TO QUESTION #8. IF NO or UNKNOWN, PLEASE SKIP TO QUESTION #13.  8.	Was the patient within 6 feet of them for at least 15 minutes? (  ) Yes   (  ) No   (  ) Unknown- Reason: _____  9.	Did the patient provide care for them? (  ) Yes   (  ) No   (  ) Unknown- Reason: ______  10.	Did the patient have direct physical contact with them (touched, hugged, or kissed them)? (  ) Yes   (  ) No    (  ) Unknown- Reason: __  11.	Did the patient share eating or drinking utensils with them? (  ) Yes   (  ) No    (  ) Unknown- Reason: ____  12.	Did they sneeze, cough, or somehow get respiratory droplets on the patient? (  ) Yes   (  ) No    (  ) Unknown- Reason: ______  13.	*Has the patient been out of [place] within the past 10 days?* (  ) Yes   (  ) No   ( x ) Unknown- Reason: _____  IF YES PLEASE ANSWER THE FOLLOWING QUESTIONS:  14.	Which state/country did they go to? ______  15.	Were they there over 24 hours? (  ) Yes   (  ) No    (  ) Unknown- Reason: ______  16.	Date of return to [facility]: ______ ===================  PRE-HOSPITAL COURSE  ===================    SOURCE: Chart     DETAILS:  Arrived via walk-in complaining of headache    ============  ED COURSE   ============    SOURCE: ED, Chart    ARRIVAL: walk-in    BELONGINGS: No items of note    BEHAVIOR: Patient arrived to the ED alert and oriented x3, patient was cooperative with ED medical and safety protocols. Patient initially without psych complaints, affect flat, later revealed sometimes has AH that can tell her to hurt son, no active HI, no SI, no VH detailed. Thought process/speech WNL, patient remained in behavioral control prior to assessment.      TREATMENT: No PRN psychiatric medication prior to assessment     VISITORS: None      ========================  COLLATERAL  ========================    NAME: More Campbell     NUMBER: [phone]    RELATIONSHIP: Mother    COMMENTS: Left voicemail requesting return call    Patient gives permission to obtain collateral from Mother_____:  ( x ) Yes  (  )  No  Rationale for overriding objection            (  ) Lack of capacity. Details: ________            (  ) Assessing risk of danger to self/others. Details: ________      Rationale for selecting specific collateral source            (  ) Potential to impact risk of danger to self/others and no alternative equivalent. Details: _____”        COVID Exposure Screen- collateral (i.e. third-party, chart review, belongings, etc; include EMS and ED staff)  1.	*Has the patient had a COVID-19 test in the last 90 days?  (  ) Yes   (  ) No   ( x ) Unknown- Reason: _____  IF YES PROCEED TO QUESTION #2. IF NO OR UNKNOWN, PLEASE SKIP TO QUESTION #3.  2.	Date of test(s) and result(s): ________  3.	*Has the patient tested positive for COVID-19 antibodies? (  ) Yes   (  ) No   (x  ) Unknown- Reason: _____  IF YES PROCEED TO QUESTION #4. IF NO or UNKNOWN, PLEASE SKIP TO QUESTION #5.  4.	Date of positive antibody test: ________  5.	*Has the patient received 2 doses of the COVID-19 vaccine? (  ) Yes   (  ) No   (x  ) Unknown- Reason: _____  IF YES PROCEED TO QUESTION #6. IF NO or UNKNOWN, PLEASE SKIP TO QUESTION #7.  6.	 Date of second dose: ________  7.	*In the past 10 days, has the patient been around anyone with a positive COVID-19 test?* (  ) Yes   (  ) No   (x  ) Unknown- Reason: __  IF YES PROCEED TO QUESTION #8. IF NO or UNKNOWN, PLEASE SKIP TO QUESTION #13.  8.	Was the patient within 6 feet of them for at least 15 minutes? (  ) Yes   (  ) No   (  ) Unknown- Reason: _____  9.	Did the patient provide care for them? (  ) Yes   (  ) No   (  ) Unknown- Reason: ______  10.	Did the patient have direct physical contact with them (touched, hugged, or kissed them)? (  ) Yes   (  ) No    (  ) Unknown- Reason: __  11.	Did the patient share eating or drinking utensils with them? (  ) Yes   (  ) No    (  ) Unknown- Reason: ____  12.	Did they sneeze, cough, or somehow get respiratory droplets on the patient? (  ) Yes   (  ) No    (  ) Unknown- Reason: ______  13.	*Has the patient been out of [place] within the past 10 days?* (  ) Yes   (  ) No   ( x ) Unknown- Reason: _____  IF YES PLEASE ANSWER THE FOLLOWING QUESTIONS:  14.	Which state/country did they go to? ______  15.	Were they there over 24 hours? (  ) Yes   (  ) No    (  ) Unknown- Reason: ______  16.	Date of return to [facility]: ______

## 2021-11-13 NOTE — ED BEHAVIORAL HEALTH NOTE - BEHAVIORAL HEALTH NOTE
CPS referral made Radha Clayton spoke with Kindra LINDER.  CPS report is based on Pts report of chronic cannabis use (4x per day) in the setting of caring for a minor child (1 y/o son) and this causing AH to harm son.   Radha CLAYTON went to meet with Pt to make Pt aware she was accepted to [facility] for inpt BH and was informed Pt was d/c'd home (lives with Mother, Grandmother and 3 yo son). Radha Clayton felt CPS needed to be aware of the situation , ED RN made aware, Psych NP, Psych MD made aware of CPS involvement. 22-21A CPS form completed.

## 2021-11-13 NOTE — PROGRESS NOTE BEHAVIORAL HEALTH - NSBHFUPINTERVALHXFT_PSY_A_CORE
From Tele[facility]: Pt is a 20yo F, domiciled with mother, grandmother and 1yo son, unemployed, no known psychiatric history, substance use history of chronic cannabis use history (notes from OB admission report use during pregnancy), pt reports hx of outpt treatment, reports last use yesterday, typical use 4x per day, no known medical problems, reports legal hx of arrest for shoplifting at age 15, no known trauma hx, consult called to evaluate AH.    11/13/2021: Patient seen and reassessed. Reports feeling better and getting some sleep overnight. States the headaches and voices are not constant but only after smoking marijuana. States she knows it is "not good for me to smoke every day' because the marijuana affects her memory, gives her headaches and the voices. States she is interested in treatment to reduce use. Denies commands. Denies S/H ideations, plans or intent. Pt is vague and a poor historian. Today she denies 'having bad thoughts', and denied questions on thoughts of hurting others or herself. Currently denying AH. She gave consent for contact with her mother, who has shared custody of her 1yo son with her son's father.     Collateral Information: Mother, More Campbell, contacted at [phone] with the assistance of Gamaliel Marquez ID# [id number] via [employer]. Mother could not add any information as to reason for patient being in the ED. Denies behavioral changes or any concerns of risk of harm to self or others. Mother had no objections to patient being discharged home and was very pleased with this disposition.

## 2021-11-13 NOTE — ED BEHAVIORAL HEALTH NOTE - BEHAVIORAL HEALTH NOTE
Radha Clayton was in contact with [facility] ([phone]) - Pt under review for possible transfer today. Radha Clayton will continue to follow up with ED, Pt and Psych NP.

## 2021-11-18 LAB
CULTURE RESULTS: SIGNIFICANT CHANGE UP
CULTURE RESULTS: SIGNIFICANT CHANGE UP
SPECIMEN SOURCE: SIGNIFICANT CHANGE UP
SPECIMEN SOURCE: SIGNIFICANT CHANGE UP

## 2023-05-10 NOTE — ED ADULT NURSE NOTE - OBJECTIVE STATEMENT
room air
pt arrives to ED complaining of headache. pt denies trauma. denies medical history. pt make homicidal statements about family member. denies SI.
(1) Female

## 2023-09-13 NOTE — ED PEDIATRIC NURSE NOTE - CHIEF COMPLAINT QUOTE
tailbone pain increased with sitting, states area is inflamed no h/o pilonidal cyst, no fall, no drainage from the area, no fevers
MEDICINE

## 2023-12-12 NOTE — ED BEHAVIORAL HEALTH ASSESSMENT NOTE - CURRENTLY ENROLLED STUDENT
No
12-11    139  |  103  |  12  ----------------------------<  121<H>  3.7   |  28  |  0.73    Ca    8.6      11 Dec 2023 07:03    TPro  6.0  /  Alb  2.6<L>  /  TBili  0.4  /  DBili  x   /  AST  36  /  ALT  30  /  AlkPhos  74  12-11  A1C with Estimated Average Glucose Result: 5.4 % (12-04-23 @ 05:30)

## 2023-12-29 NOTE — ED ADULT NURSE NOTE - PAIN RATING/NUMBER SCALE (0-10): REST
KLEBER (Gavi Huerta RN        
5

## 2024-03-01 NOTE — ED BEHAVIORAL HEALTH ASSESSMENT NOTE - MOOD
Is This A New Presentation, Or A Follow-Up?: Growth How Severe Is Your Skin Lesion?: mild Has Your Skin Lesion Been Treated?: not been treated Normal

## 2025-08-11 ENCOUNTER — APPOINTMENT (OUTPATIENT)
Age: 25
End: 2025-08-11